# Patient Record
Sex: FEMALE | Race: WHITE
[De-identification: names, ages, dates, MRNs, and addresses within clinical notes are randomized per-mention and may not be internally consistent; named-entity substitution may affect disease eponyms.]

---

## 2020-12-22 ENCOUNTER — HOSPITAL ENCOUNTER (INPATIENT)
Dept: HOSPITAL 95 - ER | Age: 70
LOS: 2 days | Discharge: HOME | DRG: 270 | End: 2020-12-24
Attending: HOSPITALIST | Admitting: HOSPITALIST
Payer: MEDICARE

## 2020-12-22 VITALS — BODY MASS INDEX: 27.55 KG/M2 | WEIGHT: 145.95 LBS | HEIGHT: 60.98 IN

## 2020-12-22 DIAGNOSIS — I82.451: ICD-10-CM

## 2020-12-22 DIAGNOSIS — I82.431: ICD-10-CM

## 2020-12-22 DIAGNOSIS — F41.9: ICD-10-CM

## 2020-12-22 DIAGNOSIS — E86.1: ICD-10-CM

## 2020-12-22 DIAGNOSIS — Z20.828: ICD-10-CM

## 2020-12-22 DIAGNOSIS — I82.421: ICD-10-CM

## 2020-12-22 DIAGNOSIS — I82.441: ICD-10-CM

## 2020-12-22 DIAGNOSIS — E87.1: ICD-10-CM

## 2020-12-22 DIAGNOSIS — I82.411: Primary | ICD-10-CM

## 2020-12-22 DIAGNOSIS — I26.99: ICD-10-CM

## 2020-12-22 DIAGNOSIS — K21.9: ICD-10-CM

## 2020-12-22 LAB
ALBUMIN SERPL BCP-MCNC: 2.5 G/DL (ref 3.4–5)
ALBUMIN/GLOB SERPL: 0.5 {RATIO} (ref 0.8–1.8)
ALT SERPL W P-5'-P-CCNC: 39 U/L (ref 12–78)
ANION GAP SERPL CALCULATED.4IONS-SCNC: 9 MMOL/L (ref 6–16)
AST SERPL W P-5'-P-CCNC: 33 U/L (ref 12–37)
BASOPHILS # BLD AUTO: 0.05 K/MM3 (ref 0–0.23)
BASOPHILS NFR BLD AUTO: 0 % (ref 0–2)
BILIRUB SERPL-MCNC: 1.5 MG/DL (ref 0.1–1)
BUN SERPL-MCNC: 15 MG/DL (ref 8–24)
CALCIUM SERPL-MCNC: 9.8 MG/DL (ref 8.5–10.1)
CHLORIDE SERPL-SCNC: 97 MMOL/L (ref 98–108)
CO2 SERPL-SCNC: 26 MMOL/L (ref 21–32)
CREAT SERPL-MCNC: 0.82 MG/DL (ref 0.4–1)
DEPRECATED RDW RBC AUTO: 46 FL (ref 35.1–46.3)
EOSINOPHIL # BLD AUTO: 0.05 K/MM3 (ref 0–0.68)
EOSINOPHIL NFR BLD AUTO: 0 % (ref 0–6)
ERYTHROCYTE [DISTWIDTH] IN BLOOD BY AUTOMATED COUNT: 13.5 % (ref 11.7–14.2)
GLOBULIN SER CALC-MCNC: 4.9 G/DL (ref 2.2–4)
GLUCOSE SERPL-MCNC: 142 MG/DL (ref 70–99)
HCT VFR BLD AUTO: 49.1 % (ref 33–51)
HGB BLD-MCNC: 16.3 G/DL (ref 11.5–16)
IMM GRANULOCYTES # BLD AUTO: 0.11 K/MM3 (ref 0–0.1)
IMM GRANULOCYTES NFR BLD AUTO: 1 % (ref 0–1)
LYMPHOCYTES # BLD AUTO: 1.28 K/MM3 (ref 0.84–5.2)
LYMPHOCYTES NFR BLD AUTO: 8 % (ref 21–46)
MCHC RBC AUTO-ENTMCNC: 33.2 G/DL (ref 31.5–36.5)
MCV RBC AUTO: 93 FL (ref 80–100)
MONOCYTES # BLD AUTO: 1.53 K/MM3 (ref 0.16–1.47)
MONOCYTES NFR BLD AUTO: 9 % (ref 4–13)
NEUTROPHILS # BLD AUTO: 13.22 K/MM3 (ref 1.96–9.15)
NEUTROPHILS NFR BLD AUTO: 81 % (ref 41–73)
NRBC # BLD AUTO: 0 K/MM3 (ref 0–0.02)
NRBC BLD AUTO-RTO: 0 /100 WBC (ref 0–0.2)
PLATELET # BLD AUTO: 225 K/MM3 (ref 150–400)
POTASSIUM SERPL-SCNC: 3.2 MMOL/L (ref 3.5–5.5)
PROT SERPL-MCNC: 7.4 G/DL (ref 6.4–8.2)
PROTHROMBIN TIME: 12.6 SEC (ref 9.7–11.5)
SODIUM SERPL-SCNC: 132 MMOL/L (ref 136–145)

## 2020-12-22 PROCEDURE — C1880 VENA CAVA FILTER: HCPCS

## 2020-12-22 PROCEDURE — C1887 CATHETER, GUIDING: HCPCS

## 2020-12-22 PROCEDURE — C1769 GUIDE WIRE: HCPCS

## 2020-12-22 PROCEDURE — C1725 CATH, TRANSLUMIN NON-LASER: HCPCS

## 2020-12-22 NOTE — NUR
SHIFT SUMMARY
PT ARRIVED FROM ED THIS EVENING, PT DENIES SIGNIFICANT PAIN IN THE RIGHT LEG,
STATES "THERE IS A SMALL AMOUNT OF PAIN WHEN I WALK" THE RIGHT LEG IS HOT/WARM
TO THE TOUCH BUT IS NOT RED, HOWEVER IT IS SWOLLEN ABOUT TWICE THE SIZE OF THE
LEFT CALF. PT ARRIVED TO PCU ON A HEPARIN GTT THAT WAS VERIFIED WITH RN
CHARGE. PT ORIENTED TO THE ROOM AND UNIT. PT IS PREPARED TO BE NPO AT MIDNIGHT
FOR PROCEDURE IN THE MORNING WITH DR. BUNDY.

## 2020-12-22 NOTE — NUR
LEFT EYE ASSESSMENT ON ADMISSION
PT REPORTS HAVING HAD SHIGLES IN THE LEFT EYE THAT HAS DAMAGED THE PUPIL AND
THE EYE MUSCLES, IT HAS NOT INTERFERED WITH VISION HOWEVER THE LEFT PUPIL IS
ENLARGED.

## 2020-12-23 LAB
BASOPHILS # BLD AUTO: 0.04 K/MM3 (ref 0–0.23)
BASOPHILS NFR BLD AUTO: 0 % (ref 0–2)
DEPRECATED RDW RBC AUTO: 45.4 FL (ref 35.1–46.3)
EOSINOPHIL # BLD AUTO: 0.13 K/MM3 (ref 0–0.68)
EOSINOPHIL NFR BLD AUTO: 1 % (ref 0–6)
ERYTHROCYTE [DISTWIDTH] IN BLOOD BY AUTOMATED COUNT: 13.2 % (ref 11.7–14.2)
HCT VFR BLD AUTO: 44.5 % (ref 33–51)
HGB BLD-MCNC: 14.7 G/DL (ref 11.5–16)
IMM GRANULOCYTES # BLD AUTO: 0.04 K/MM3 (ref 0–0.1)
IMM GRANULOCYTES NFR BLD AUTO: 0 % (ref 0–1)
LYMPHOCYTES # BLD AUTO: 1.65 K/MM3 (ref 0.84–5.2)
LYMPHOCYTES NFR BLD AUTO: 14 % (ref 21–46)
MCHC RBC AUTO-ENTMCNC: 33 G/DL (ref 31.5–36.5)
MCV RBC AUTO: 93 FL (ref 80–100)
MONOCYTES # BLD AUTO: 1.29 K/MM3 (ref 0.16–1.47)
MONOCYTES NFR BLD AUTO: 11 % (ref 4–13)
NEUTROPHILS # BLD AUTO: 8.54 K/MM3 (ref 1.96–9.15)
NEUTROPHILS NFR BLD AUTO: 73 % (ref 41–73)
NRBC # BLD AUTO: 0 K/MM3 (ref 0–0.02)
NRBC BLD AUTO-RTO: 0 /100 WBC (ref 0–0.2)
PLATELET # BLD AUTO: 251 K/MM3 (ref 150–400)

## 2020-12-23 PROCEDURE — 06CM3ZZ EXTIRPATION OF MATTER FROM RIGHT FEMORAL VEIN, PERCUTANEOUS APPROACH: ICD-10-PCS | Performed by: RADIOLOGY

## 2020-12-23 PROCEDURE — 067M3ZZ DILATION OF RIGHT FEMORAL VEIN, PERCUTANEOUS APPROACH: ICD-10-PCS | Performed by: RADIOLOGY

## 2020-12-23 PROCEDURE — B51F1ZZ FLUOROSCOPY OF RIGHT PELVIC (ILIAC) VEINS USING LOW OSMOLAR CONTRAST: ICD-10-PCS | Performed by: RADIOLOGY

## 2020-12-23 PROCEDURE — 06CF3ZZ EXTIRPATION OF MATTER FROM RIGHT EXTERNAL ILIAC VEIN, PERCUTANEOUS APPROACH: ICD-10-PCS | Performed by: RADIOLOGY

## 2020-12-23 PROCEDURE — 06H03DZ INSERTION OF INTRALUMINAL DEVICE INTO INFERIOR VENA CAVA, PERCUTANEOUS APPROACH: ICD-10-PCS | Performed by: RADIOLOGY

## 2020-12-23 PROCEDURE — 06CY3ZZ EXTIRPATION OF MATTER FROM LOWER VEIN, PERCUTANEOUS APPROACH: ICD-10-PCS | Performed by: RADIOLOGY

## 2020-12-23 PROCEDURE — B51BZZZ FLUOROSCOPY OF RIGHT LOWER EXTREMITY VEINS: ICD-10-PCS | Performed by: RADIOLOGY

## 2020-12-23 NOTE — NUR
SHIFT SUMMARY
PT A&Ox4; CALM AND COOPERATIVE WITH CARE. PT RESTING IN BED DURING SHIFT. UP
TO BSC WITH ASSISTANCE. PT TO CATH LAB THIS AM FOR THROMBECTOMY; LEFT GROIN
AND RIGHT POP. VENOUS ACCESS SITES; VSS. PT DENIES PAIN, SOB, NAUSEA,
DIZZINESS AND NUMT/TINGLING. NO OTHER ACUTE CHAGNES MOTED DURING SHIFT. WILL
CONTINUE TO MONITOR UNTIL REPORT GIVEN TO ONCOMING RN.

## 2020-12-24 NOTE — NUR
SHIFT SUMMARY
 
PT RESTED WELL THROUGH NIGHT. ALERT AND ORIENTED - ABLE TO MAKE NEEDS KNOWN.
SATS >90% ON ROOM AIR. TELE NSR. AMBULATING TO BATHROOM - FREQUENT VOIDS, NO
BM. NO C/O PAIN. VSS. HOPEFUL TO GO HOME TODAY. FEMORAL AND POPLITEAL SITES
C/D/I. TRANSITIONED FROM HEP GTT TO XARELTO.
 
CALL LIGHT WITHIN REACH, BED IN LOWEST POSITION. WILL CONTINUE TO MONITOR.

## 2020-12-24 NOTE — NUR
DISCHARGE SUMMARY
PT A&Ox4; CALM AND COOPERATIVE WITH CARE. PT RESTING INBED DURING SHIFT. UP
SBA IN ROOM. PT DENIES PAIN, SOB, NASUEA, DIZZINESS AND NUMB/TING. LEFT GROIN
AND RIGHT POPLITEAL SITES C/D/I. VSS. NO OTHER ACUTE CHAGNES NOTED DURING
SHIFT. EDUCATED ON DISCHARGE INSTRUCTIONS, FOLLOW UP APPOINTMENTS AND
MEDICATIONS. PRESCRIPTIONS FAXED TO [facility] PER PT REQUEST. EDUCATED PT ON
ANTICOAGULENT AND ACTIVITY RESTRICTIONS. PT LEFT ROOM VIA WHEELCHAIR AT 1217.

## 2021-03-11 ENCOUNTER — HOSPITAL ENCOUNTER (OUTPATIENT)
Dept: HOSPITAL 95 - ORSCSDS | Age: 71
Discharge: HOME | End: 2021-03-11
Attending: INTERNAL MEDICINE
Payer: MEDICARE

## 2021-03-11 VITALS — HEIGHT: 60.98 IN | WEIGHT: 145.73 LBS | BODY MASS INDEX: 27.51 KG/M2

## 2021-03-11 DIAGNOSIS — F41.9: ICD-10-CM

## 2021-03-11 DIAGNOSIS — Z79.899: ICD-10-CM

## 2021-03-11 DIAGNOSIS — K44.9: ICD-10-CM

## 2021-03-11 DIAGNOSIS — R11.0: ICD-10-CM

## 2021-03-11 DIAGNOSIS — F32.9: ICD-10-CM

## 2021-03-11 DIAGNOSIS — R10.9: Primary | ICD-10-CM

## 2021-03-11 PROCEDURE — 0DBA8ZX EXCISION OF JEJUNUM, VIA NATURAL OR ARTIFICIAL OPENING ENDOSCOPIC, DIAGNOSTIC: ICD-10-PCS | Performed by: INTERNAL MEDICINE

## 2021-03-11 PROCEDURE — 0DB68ZX EXCISION OF STOMACH, VIA NATURAL OR ARTIFICIAL OPENING ENDOSCOPIC, DIAGNOSTIC: ICD-10-PCS | Performed by: INTERNAL MEDICINE

## 2021-03-25 ENCOUNTER — HOSPITAL ENCOUNTER (OUTPATIENT)
Dept: HOSPITAL 95 - MHTC | Age: 71
Discharge: HOME | End: 2021-03-25
Attending: RADIOLOGY
Payer: MEDICARE

## 2021-03-25 VITALS — HEIGHT: 61 IN | BODY MASS INDEX: 27.06 KG/M2 | WEIGHT: 143.3 LBS

## 2021-03-25 DIAGNOSIS — Z86.711: ICD-10-CM

## 2021-03-25 DIAGNOSIS — Z88.6: ICD-10-CM

## 2021-03-25 DIAGNOSIS — Z88.1: ICD-10-CM

## 2021-03-25 DIAGNOSIS — R03.0: ICD-10-CM

## 2021-03-25 DIAGNOSIS — K21.9: ICD-10-CM

## 2021-03-25 DIAGNOSIS — Z88.2: ICD-10-CM

## 2021-03-25 DIAGNOSIS — I82.4Y9: Primary | ICD-10-CM

## 2021-03-25 PROCEDURE — C1773 RET DEV, INSERTABLE: HCPCS

## 2021-03-25 PROCEDURE — C1894 INTRO/SHEATH, NON-LASER: HCPCS

## 2021-12-16 ENCOUNTER — HOSPITAL ENCOUNTER (OUTPATIENT)
Dept: HOSPITAL 95 - ORSCSDS | Age: 71
Discharge: HOME | End: 2021-12-16
Attending: OPHTHALMOLOGY
Payer: MEDICARE

## 2021-12-16 VITALS — WEIGHT: 144.18 LBS | BODY MASS INDEX: 27.22 KG/M2 | HEIGHT: 61 IN

## 2021-12-16 DIAGNOSIS — F41.9: ICD-10-CM

## 2021-12-16 DIAGNOSIS — Z79.899: ICD-10-CM

## 2021-12-16 DIAGNOSIS — H25.12: Primary | ICD-10-CM

## 2021-12-16 DIAGNOSIS — Z79.01: ICD-10-CM

## 2021-12-16 DIAGNOSIS — K21.9: ICD-10-CM

## 2021-12-16 DIAGNOSIS — I10: ICD-10-CM

## 2021-12-16 PROCEDURE — V2632 POST CHMBR INTRAOCULAR LENS: HCPCS

## 2021-12-16 PROCEDURE — 08RK3JZ REPLACEMENT OF LEFT LENS WITH SYNTHETIC SUBSTITUTE, PERCUTANEOUS APPROACH: ICD-10-PCS | Performed by: OPHTHALMOLOGY

## 2022-03-14 ENCOUNTER — HOSPITAL ENCOUNTER (OUTPATIENT)
Dept: HOSPITAL 95 - LAB SHORT | Age: 72
End: 2022-03-14
Attending: FAMILY MEDICINE
Payer: MEDICARE

## 2022-03-14 DIAGNOSIS — N93.0: Primary | ICD-10-CM

## 2022-05-11 ENCOUNTER — HOSPITAL ENCOUNTER (OUTPATIENT)
Dept: HOSPITAL 95 - LAB SHORT | Age: 72
End: 2022-05-11
Attending: ADVANCED PRACTICE MIDWIFE
Payer: MEDICARE

## 2022-05-11 DIAGNOSIS — N93.0: Primary | ICD-10-CM

## 2022-06-28 ENCOUNTER — HOSPITAL ENCOUNTER (OUTPATIENT)
Dept: HOSPITAL 95 - LAB SHORT | Age: 72
End: 2022-06-28
Attending: ADVANCED PRACTICE MIDWIFE
Payer: MEDICARE

## 2022-06-28 DIAGNOSIS — N76.0: Primary | ICD-10-CM

## 2022-07-01 ENCOUNTER — HOSPITAL ENCOUNTER (OUTPATIENT)
Dept: HOSPITAL 95 - LAB SHORT | Age: 72
End: 2022-07-01
Attending: ADVANCED PRACTICE MIDWIFE
Payer: MEDICARE

## 2022-07-01 DIAGNOSIS — R35.0: Primary | ICD-10-CM

## 2022-10-08 ENCOUNTER — HOSPITAL ENCOUNTER (OUTPATIENT)
Dept: HOSPITAL 95 - LAB | Age: 72
Discharge: HOME | End: 2022-10-08
Attending: FAMILY MEDICINE
Payer: MEDICARE

## 2022-10-08 DIAGNOSIS — R30.9: Primary | ICD-10-CM

## 2022-11-12 ENCOUNTER — HOSPITAL ENCOUNTER (EMERGENCY)
Dept: HOSPITAL 95 - ER | Age: 72
Discharge: HOME | End: 2022-11-12
Payer: MEDICARE

## 2022-11-12 VITALS — HEIGHT: 61 IN | BODY MASS INDEX: 25.49 KG/M2 | WEIGHT: 134.99 LBS

## 2022-11-12 DIAGNOSIS — Z88.2: ICD-10-CM

## 2022-11-12 DIAGNOSIS — Z88.8: ICD-10-CM

## 2022-11-12 DIAGNOSIS — Z79.899: ICD-10-CM

## 2022-11-12 DIAGNOSIS — T78.49XA: Primary | ICD-10-CM

## 2022-11-12 DIAGNOSIS — Z88.5: ICD-10-CM

## 2023-03-02 ENCOUNTER — HOSPITAL ENCOUNTER (OUTPATIENT)
Dept: HOSPITAL 95 - LAB | Age: 73
Discharge: HOME | End: 2023-03-02
Attending: NURSE PRACTITIONER
Payer: MEDICARE

## 2023-03-02 DIAGNOSIS — R30.0: Primary | ICD-10-CM

## 2023-04-04 ENCOUNTER — HOSPITAL ENCOUNTER (OUTPATIENT)
Dept: HOSPITAL 95 - LAB SHORT | Age: 73
Discharge: HOME | End: 2023-04-04
Attending: NURSE PRACTITIONER
Payer: MEDICARE

## 2023-04-04 DIAGNOSIS — N30.90: Primary | ICD-10-CM

## 2023-04-17 ENCOUNTER — HOSPITAL ENCOUNTER (OUTPATIENT)
Dept: HOSPITAL 95 - LAB | Age: 73
Discharge: HOME | End: 2023-04-17
Attending: FAMILY MEDICINE
Payer: MEDICARE

## 2023-04-17 DIAGNOSIS — R30.0: Primary | ICD-10-CM

## 2023-08-29 ENCOUNTER — HOSPITAL ENCOUNTER (OUTPATIENT)
Dept: HOSPITAL 95 - LAB | Age: 73
Discharge: HOME | End: 2023-08-29
Attending: NURSE PRACTITIONER
Payer: MEDICARE

## 2023-08-29 DIAGNOSIS — N89.8: Primary | ICD-10-CM

## 2023-08-29 DIAGNOSIS — R30.0: ICD-10-CM

## 2024-08-12 ENCOUNTER — HOSPITAL ENCOUNTER (OUTPATIENT)
Dept: HOSPITAL 95 - LAB SHORT | Age: 74
End: 2024-08-12
Attending: NURSE PRACTITIONER
Payer: MEDICARE

## 2024-08-12 DIAGNOSIS — R30.0: ICD-10-CM

## 2024-08-12 DIAGNOSIS — N89.8: Primary | ICD-10-CM

## 2024-08-12 LAB
C GLABRATA+KRUSEI DNA VAG QL NAA+PROBE: NOT DETECTED
CANDIDA DNA VAG QL NAA+PROBE: NOT DETECTED

## 2024-08-21 ENCOUNTER — HOSPITAL ENCOUNTER (OUTPATIENT)
Dept: HOSPITAL 95 - LAB | Age: 74
End: 2024-08-21
Attending: FAMILY MEDICINE
Payer: MEDICARE

## 2024-08-21 DIAGNOSIS — N89.8: Primary | ICD-10-CM

## 2024-08-21 LAB
C GLABRATA+KRUSEI DNA VAG QL NAA+PROBE: NOT DETECTED
CANDIDA DNA VAG QL NAA+PROBE: NOT DETECTED
RBC #/AREA URNS HPF: (no result) /HPF (ref 0–2)
SP GR SPEC: 1.01 (ref 1–1.02)
UROBILINOGEN UR STRIP-MCNC: (no result) MG/DL
WBC #/AREA URNS HPF: (no result) /HPF (ref 0–5)

## 2024-10-29 ENCOUNTER — HOSPITAL ENCOUNTER (INPATIENT)
Dept: HOSPITAL 95 - ER | Age: 74
LOS: 4 days | Discharge: HOME | DRG: 872 | End: 2024-11-02
Attending: INTERNAL MEDICINE | Admitting: STUDENT IN AN ORGANIZED HEALTH CARE EDUCATION/TRAINING PROGRAM
Payer: MEDICARE

## 2024-10-29 VITALS — BODY MASS INDEX: 29.39 KG/M2 | WEIGHT: 149.69 LBS | HEIGHT: 60 IN

## 2024-10-29 VITALS — DIASTOLIC BLOOD PRESSURE: 67 MMHG | SYSTOLIC BLOOD PRESSURE: 115 MMHG

## 2024-10-29 VITALS — DIASTOLIC BLOOD PRESSURE: 62 MMHG | SYSTOLIC BLOOD PRESSURE: 104 MMHG

## 2024-10-29 DIAGNOSIS — N13.6: ICD-10-CM

## 2024-10-29 DIAGNOSIS — Z88.5: ICD-10-CM

## 2024-10-29 DIAGNOSIS — Z87.442: ICD-10-CM

## 2024-10-29 DIAGNOSIS — Z88.0: ICD-10-CM

## 2024-10-29 DIAGNOSIS — E87.6: ICD-10-CM

## 2024-10-29 DIAGNOSIS — G47.30: ICD-10-CM

## 2024-10-29 DIAGNOSIS — Z88.1: ICD-10-CM

## 2024-10-29 DIAGNOSIS — Z88.2: ICD-10-CM

## 2024-10-29 DIAGNOSIS — F41.0: ICD-10-CM

## 2024-10-29 DIAGNOSIS — Z98.890: ICD-10-CM

## 2024-10-29 DIAGNOSIS — Z79.899: ICD-10-CM

## 2024-10-29 DIAGNOSIS — Z88.8: ICD-10-CM

## 2024-10-29 DIAGNOSIS — Z16.11: ICD-10-CM

## 2024-10-29 DIAGNOSIS — A41.51: Primary | ICD-10-CM

## 2024-10-29 DIAGNOSIS — D69.6: ICD-10-CM

## 2024-10-29 DIAGNOSIS — K21.9: ICD-10-CM

## 2024-10-29 LAB
ALBUMIN SERPL BCP-MCNC: 3 G/DL (ref 3.4–5)
ALBUMIN/GLOB SERPL: 0.8 {RATIO} (ref 0.8–1.8)
ALT SERPL W P-5'-P-CCNC: 32 U/L (ref 12–78)
ANION GAP SERPL CALCULATED.4IONS-SCNC: 11 MMOL/L (ref 3–11)
AST SERPL W P-5'-P-CCNC: 59 U/L (ref 12–37)
BASOPHILS # BLD: 0 K/MM3 (ref 0–0.23)
BASOPHILS NFR BLD: 0 % (ref 0–2)
BILIRUB SERPL-MCNC: 2.9 MG/DL (ref 0.1–1)
BUN SERPL-MCNC: 20 MG/DL (ref 8–24)
CALCIUM SERPL-MCNC: 9.5 MG/DL (ref 8.5–10.1)
CHLORIDE SERPL-SCNC: 105 MMOL/L (ref 98–108)
CO2 SERPL-SCNC: 21 MMOL/L (ref 21–32)
CREAT SERPL-MCNC: 0.9 MG/DL (ref 0.4–1)
DEPRECATED RDW RBC AUTO: 42 FL (ref 35.1–46.3)
EOSINOPHIL # BLD: 0 K/MM3 (ref 0–0.68)
EOSINOPHIL NFR BLD: 0 % (ref 0–6)
ERYTHROCYTE [DISTWIDTH] IN BLOOD BY AUTOMATED COUNT: 12.7 % (ref 11.7–14.2)
FLUAV RNA SPEC QL NAA+PROBE: NEGATIVE
FLUBV RNA SPEC QL NAA+PROBE: NEGATIVE
GLOBULIN SER CALC-MCNC: 3.8 G/DL (ref 2.2–4)
GLUCOSE SERPL-MCNC: 139 MG/DL (ref 70–99)
HCT VFR BLD AUTO: 43.2 % (ref 33–51)
HGB BLD-MCNC: 15.2 G/DL (ref 11.5–16)
KETONES UR STRIP-MCNC: (no result) MG/DL
LEUKOCYTE ESTERASE UR QL STRIP: (no result)
LYMPHOCYTES # BLD: 0.32 K/MM3 (ref 0.84–5.2)
LYMPHOCYTES NFR BLD: 7 % (ref 21–46)
MCHC RBC AUTO-ENTMCNC: 35.2 G/DL (ref 31.5–36.5)
MCV RBC AUTO: 91 FL (ref 80–100)
MONOCYTES # BLD: 0.04 K/MM3 (ref 0.16–1.47)
MONOCYTES NFR BLD: 1 % (ref 4–13)
NEUTS BAND NFR BLD MANUAL: 5 % (ref 0–8)
NEUTS SEG # BLD MANUAL: 4.33 K/MM3 (ref 1.96–9.15)
NEUTS SEG NFR BLD MANUAL: 87 % (ref 41–73)
NRBC # BLD AUTO: 0 K/MM3 (ref 0–0.02)
NRBC BLD AUTO-RTO: 0 /100 WBC (ref 0–0.2)
PLATELET # BLD AUTO: 90 K/MM3 (ref 150–400)
POTASSIUM SERPL-SCNC: 3.3 MMOL/L (ref 3.5–5.5)
PROT SERPL-MCNC: 6.8 G/DL (ref 6.4–8.2)
PROT UR STRIP-MCNC: (no result) MG/DL
RBC #/AREA URNS HPF: (no result) /HPF (ref 0–2)
RSV RNA SPEC QL NAA+PROBE: NEGATIVE
SARS-COV-2 RNA RESP QL NAA+PROBE: NEGATIVE
SODIUM SERPL-SCNC: 134 MMOL/L (ref 136–145)
SP GR SPEC: 1.02 (ref 1–1.02)
TOTAL CELLS COUNTED BLD: 100
UROBILINOGEN UR STRIP-MCNC: (no result) MG/DL
WBC #/AREA URNS HPF: (no result) /HPF (ref 0–5)

## 2024-10-29 PROCEDURE — A9270 NON-COVERED ITEM OR SERVICE: HCPCS

## 2024-10-29 PROCEDURE — C1769 GUIDE WIRE: HCPCS

## 2024-10-29 PROCEDURE — C1729 CATH, DRAINAGE: HCPCS

## 2024-10-30 VITALS — SYSTOLIC BLOOD PRESSURE: 99 MMHG | DIASTOLIC BLOOD PRESSURE: 63 MMHG

## 2024-10-30 VITALS — DIASTOLIC BLOOD PRESSURE: 72 MMHG | SYSTOLIC BLOOD PRESSURE: 100 MMHG

## 2024-10-30 VITALS — DIASTOLIC BLOOD PRESSURE: 98 MMHG | SYSTOLIC BLOOD PRESSURE: 134 MMHG

## 2024-10-30 VITALS — DIASTOLIC BLOOD PRESSURE: 59 MMHG | SYSTOLIC BLOOD PRESSURE: 102 MMHG

## 2024-10-30 VITALS — DIASTOLIC BLOOD PRESSURE: 60 MMHG | SYSTOLIC BLOOD PRESSURE: 100 MMHG

## 2024-10-30 VITALS — DIASTOLIC BLOOD PRESSURE: 61 MMHG | SYSTOLIC BLOOD PRESSURE: 97 MMHG

## 2024-10-30 VITALS — SYSTOLIC BLOOD PRESSURE: 103 MMHG | DIASTOLIC BLOOD PRESSURE: 66 MMHG

## 2024-10-30 VITALS — SYSTOLIC BLOOD PRESSURE: 104 MMHG | DIASTOLIC BLOOD PRESSURE: 70 MMHG

## 2024-10-30 VITALS — SYSTOLIC BLOOD PRESSURE: 108 MMHG | DIASTOLIC BLOOD PRESSURE: 69 MMHG

## 2024-10-30 VITALS — SYSTOLIC BLOOD PRESSURE: 137 MMHG | DIASTOLIC BLOOD PRESSURE: 74 MMHG

## 2024-10-30 VITALS — DIASTOLIC BLOOD PRESSURE: 62 MMHG | SYSTOLIC BLOOD PRESSURE: 98 MMHG

## 2024-10-30 VITALS — DIASTOLIC BLOOD PRESSURE: 63 MMHG | SYSTOLIC BLOOD PRESSURE: 104 MMHG

## 2024-10-30 VITALS — SYSTOLIC BLOOD PRESSURE: 100 MMHG | DIASTOLIC BLOOD PRESSURE: 65 MMHG

## 2024-10-30 VITALS — DIASTOLIC BLOOD PRESSURE: 80 MMHG | SYSTOLIC BLOOD PRESSURE: 126 MMHG

## 2024-10-30 VITALS — SYSTOLIC BLOOD PRESSURE: 104 MMHG | DIASTOLIC BLOOD PRESSURE: 60 MMHG

## 2024-10-30 VITALS — SYSTOLIC BLOOD PRESSURE: 95 MMHG | DIASTOLIC BLOOD PRESSURE: 69 MMHG

## 2024-10-30 VITALS — DIASTOLIC BLOOD PRESSURE: 90 MMHG | SYSTOLIC BLOOD PRESSURE: 106 MMHG

## 2024-10-30 VITALS — DIASTOLIC BLOOD PRESSURE: 74 MMHG | SYSTOLIC BLOOD PRESSURE: 111 MMHG

## 2024-10-30 VITALS — SYSTOLIC BLOOD PRESSURE: 110 MMHG | DIASTOLIC BLOOD PRESSURE: 68 MMHG

## 2024-10-30 VITALS — DIASTOLIC BLOOD PRESSURE: 60 MMHG | SYSTOLIC BLOOD PRESSURE: 97 MMHG

## 2024-10-30 VITALS — DIASTOLIC BLOOD PRESSURE: 54 MMHG | SYSTOLIC BLOOD PRESSURE: 93 MMHG

## 2024-10-30 VITALS — DIASTOLIC BLOOD PRESSURE: 57 MMHG | SYSTOLIC BLOOD PRESSURE: 108 MMHG

## 2024-10-30 VITALS — SYSTOLIC BLOOD PRESSURE: 99 MMHG | DIASTOLIC BLOOD PRESSURE: 67 MMHG

## 2024-10-30 VITALS — DIASTOLIC BLOOD PRESSURE: 67 MMHG | SYSTOLIC BLOOD PRESSURE: 97 MMHG

## 2024-10-30 VITALS — DIASTOLIC BLOOD PRESSURE: 62 MMHG | SYSTOLIC BLOOD PRESSURE: 96 MMHG

## 2024-10-30 VITALS — SYSTOLIC BLOOD PRESSURE: 100 MMHG | DIASTOLIC BLOOD PRESSURE: 86 MMHG

## 2024-10-30 VITALS — DIASTOLIC BLOOD PRESSURE: 83 MMHG | SYSTOLIC BLOOD PRESSURE: 118 MMHG

## 2024-10-30 VITALS — SYSTOLIC BLOOD PRESSURE: 93 MMHG | DIASTOLIC BLOOD PRESSURE: 64 MMHG

## 2024-10-30 VITALS — DIASTOLIC BLOOD PRESSURE: 57 MMHG | SYSTOLIC BLOOD PRESSURE: 101 MMHG

## 2024-10-30 VITALS — DIASTOLIC BLOOD PRESSURE: 58 MMHG | SYSTOLIC BLOOD PRESSURE: 93 MMHG

## 2024-10-30 VITALS — SYSTOLIC BLOOD PRESSURE: 101 MMHG | DIASTOLIC BLOOD PRESSURE: 62 MMHG

## 2024-10-30 VITALS — SYSTOLIC BLOOD PRESSURE: 104 MMHG | DIASTOLIC BLOOD PRESSURE: 62 MMHG

## 2024-10-30 VITALS — SYSTOLIC BLOOD PRESSURE: 77 MMHG | DIASTOLIC BLOOD PRESSURE: 55 MMHG

## 2024-10-30 VITALS — DIASTOLIC BLOOD PRESSURE: 63 MMHG | SYSTOLIC BLOOD PRESSURE: 94 MMHG

## 2024-10-30 VITALS — DIASTOLIC BLOOD PRESSURE: 64 MMHG | SYSTOLIC BLOOD PRESSURE: 95 MMHG

## 2024-10-30 VITALS — SYSTOLIC BLOOD PRESSURE: 128 MMHG | DIASTOLIC BLOOD PRESSURE: 109 MMHG

## 2024-10-30 VITALS — DIASTOLIC BLOOD PRESSURE: 61 MMHG | SYSTOLIC BLOOD PRESSURE: 106 MMHG

## 2024-10-30 VITALS — DIASTOLIC BLOOD PRESSURE: 64 MMHG | SYSTOLIC BLOOD PRESSURE: 97 MMHG

## 2024-10-30 VITALS — SYSTOLIC BLOOD PRESSURE: 109 MMHG | DIASTOLIC BLOOD PRESSURE: 69 MMHG

## 2024-10-30 VITALS — SYSTOLIC BLOOD PRESSURE: 113 MMHG | DIASTOLIC BLOOD PRESSURE: 96 MMHG

## 2024-10-30 VITALS — DIASTOLIC BLOOD PRESSURE: 47 MMHG | SYSTOLIC BLOOD PRESSURE: 90 MMHG

## 2024-10-30 VITALS — DIASTOLIC BLOOD PRESSURE: 75 MMHG | SYSTOLIC BLOOD PRESSURE: 104 MMHG

## 2024-10-30 VITALS — DIASTOLIC BLOOD PRESSURE: 52 MMHG | SYSTOLIC BLOOD PRESSURE: 94 MMHG

## 2024-10-30 VITALS — SYSTOLIC BLOOD PRESSURE: 108 MMHG | DIASTOLIC BLOOD PRESSURE: 60 MMHG

## 2024-10-30 VITALS — SYSTOLIC BLOOD PRESSURE: 97 MMHG | DIASTOLIC BLOOD PRESSURE: 60 MMHG

## 2024-10-30 VITALS — SYSTOLIC BLOOD PRESSURE: 121 MMHG | DIASTOLIC BLOOD PRESSURE: 71 MMHG

## 2024-10-30 VITALS — DIASTOLIC BLOOD PRESSURE: 60 MMHG | SYSTOLIC BLOOD PRESSURE: 102 MMHG

## 2024-10-30 VITALS — SYSTOLIC BLOOD PRESSURE: 106 MMHG | DIASTOLIC BLOOD PRESSURE: 66 MMHG

## 2024-10-30 VITALS — DIASTOLIC BLOOD PRESSURE: 61 MMHG | SYSTOLIC BLOOD PRESSURE: 103 MMHG

## 2024-10-30 VITALS — SYSTOLIC BLOOD PRESSURE: 99 MMHG | DIASTOLIC BLOOD PRESSURE: 66 MMHG

## 2024-10-30 VITALS — SYSTOLIC BLOOD PRESSURE: 98 MMHG | DIASTOLIC BLOOD PRESSURE: 64 MMHG

## 2024-10-30 LAB
ALBUMIN SERPL BCP-MCNC: 2.2 G/DL (ref 3.4–5)
ALBUMIN/GLOB SERPL: 0.8 {RATIO} (ref 0.8–1.8)
ALT SERPL W P-5'-P-CCNC: 21 U/L (ref 12–78)
ANION GAP SERPL CALCULATED.4IONS-SCNC: 10 MMOL/L (ref 3–11)
AST SERPL W P-5'-P-CCNC: 33 U/L (ref 12–37)
BASOPHILS # BLD: 0 K/MM3 (ref 0–0.23)
BASOPHILS NFR BLD: 0 % (ref 0–2)
BILIRUB SERPL-MCNC: 2.4 MG/DL (ref 0.1–1)
BUN SERPL-MCNC: 18 MG/DL (ref 8–24)
CALCIUM SERPL-MCNC: 8 MG/DL (ref 8.5–10.1)
CHLORIDE SERPL-SCNC: 110 MMOL/L (ref 98–108)
CO2 SERPL-SCNC: 21 MMOL/L (ref 21–32)
CREAT SERPL-MCNC: 0.97 MG/DL (ref 0.4–1)
DEPRECATED RDW RBC AUTO: 43.6 FL (ref 35.1–46.3)
EOSINOPHIL # BLD: 0 K/MM3 (ref 0–0.68)
EOSINOPHIL NFR BLD: 0 % (ref 0–6)
ERYTHROCYTE [DISTWIDTH] IN BLOOD BY AUTOMATED COUNT: 12.9 % (ref 11.7–14.2)
GLOBULIN SER CALC-MCNC: 2.8 G/DL (ref 2.2–4)
GLUCOSE SERPL-MCNC: 92 MG/DL (ref 70–99)
HCT VFR BLD AUTO: 35.7 % (ref 33–51)
HGB BLD-MCNC: 12.2 G/DL (ref 11.5–16)
LYMPHOCYTES # BLD: 0.05 K/MM3 (ref 0.84–5.2)
LYMPHOCYTES NFR BLD: 1 % (ref 21–46)
MCHC RBC AUTO-ENTMCNC: 34.2 G/DL (ref 31.5–36.5)
MCV RBC AUTO: 92 FL (ref 80–100)
METAMYELOCYTES # BLD MANUAL: 0.05 K/MM3 (ref 0–0)
METAMYELOCYTES NFR BLD MANUAL: 1 % (ref 0–0)
MONOCYTES # BLD: 0.11 K/MM3 (ref 0.16–1.47)
MONOCYTES NFR BLD: 2 % (ref 4–13)
MYELOCYTES # BLD MANUAL: 0.05 K/MM3 (ref 0–0)
MYELOCYTES NFR BLD MANUAL: 1 % (ref 0–0)
NEUTS BAND NFR BLD MANUAL: 25 % (ref 0–8)
NEUTS SEG # BLD MANUAL: 5.43 K/MM3 (ref 1.96–9.15)
NEUTS SEG NFR BLD MANUAL: 70 % (ref 41–73)
NRBC # BLD AUTO: 0 K/MM3 (ref 0–0.02)
NRBC BLD AUTO-RTO: 0 /100 WBC (ref 0–0.2)
PLATELET # BLD AUTO: 54 K/MM3 (ref 150–400)
POTASSIUM SERPL-SCNC: 3.5 MMOL/L (ref 3.5–5.5)
PROT SERPL-MCNC: 5 G/DL (ref 6.4–8.2)
SODIUM SERPL-SCNC: 137 MMOL/L (ref 136–145)
TOTAL CELLS COUNTED BLD: 100

## 2024-10-30 PROCEDURE — 3E03329 INTRODUCTION OF OTHER ANTI-INFECTIVE INTO PERIPHERAL VEIN, PERCUTANEOUS APPROACH: ICD-10-PCS | Performed by: INTERNAL MEDICINE

## 2024-10-30 PROCEDURE — 0T9330Z DRAINAGE OF RIGHT KIDNEY PELVIS WITH DRAINAGE DEVICE, PERCUTANEOUS APPROACH: ICD-10-PCS | Performed by: INTERNAL MEDICINE

## 2024-10-31 VITALS — SYSTOLIC BLOOD PRESSURE: 134 MMHG | DIASTOLIC BLOOD PRESSURE: 94 MMHG

## 2024-10-31 VITALS — SYSTOLIC BLOOD PRESSURE: 119 MMHG | DIASTOLIC BLOOD PRESSURE: 74 MMHG

## 2024-10-31 VITALS — SYSTOLIC BLOOD PRESSURE: 139 MMHG | DIASTOLIC BLOOD PRESSURE: 60 MMHG

## 2024-10-31 VITALS — DIASTOLIC BLOOD PRESSURE: 82 MMHG | SYSTOLIC BLOOD PRESSURE: 117 MMHG

## 2024-10-31 VITALS — DIASTOLIC BLOOD PRESSURE: 81 MMHG | SYSTOLIC BLOOD PRESSURE: 142 MMHG

## 2024-10-31 VITALS — DIASTOLIC BLOOD PRESSURE: 72 MMHG | SYSTOLIC BLOOD PRESSURE: 114 MMHG

## 2024-10-31 VITALS — SYSTOLIC BLOOD PRESSURE: 136 MMHG | DIASTOLIC BLOOD PRESSURE: 92 MMHG

## 2024-10-31 VITALS — SYSTOLIC BLOOD PRESSURE: 146 MMHG | DIASTOLIC BLOOD PRESSURE: 91 MMHG

## 2024-10-31 LAB
ANION GAP SERPL CALCULATED.4IONS-SCNC: 11 MMOL/L (ref 3–11)
BASOPHILS # BLD AUTO: 0.03 K/MM3 (ref 0–0.23)
BASOPHILS NFR BLD AUTO: 0 % (ref 0–2)
BUN SERPL-MCNC: 17 MG/DL (ref 8–24)
CALCIUM SERPL-MCNC: 9.1 MG/DL (ref 8.5–10.1)
CHLORIDE SERPL-SCNC: 111 MMOL/L (ref 98–108)
CO2 SERPL-SCNC: 21 MMOL/L (ref 21–32)
CREAT SERPL-MCNC: 0.74 MG/DL (ref 0.4–1)
DEPRECATED RDW RBC AUTO: 45.3 FL (ref 35.1–46.3)
EOSINOPHIL # BLD AUTO: 0.03 K/MM3 (ref 0–0.68)
EOSINOPHIL NFR BLD AUTO: 0 % (ref 0–6)
ERYTHROCYTE [DISTWIDTH] IN BLOOD BY AUTOMATED COUNT: 13.3 % (ref 11.7–14.2)
GLUCOSE SERPL-MCNC: 90 MG/DL (ref 70–99)
HCT VFR BLD AUTO: 41.7 % (ref 33–51)
HGB BLD-MCNC: 14.1 G/DL (ref 11.5–16)
IMM GRANULOCYTES # BLD AUTO: 0.04 K/MM3 (ref 0–0.1)
IMM GRANULOCYTES NFR BLD AUTO: 1 % (ref 0–1)
LYMPHOCYTES # BLD AUTO: 0.49 K/MM3 (ref 0.84–5.2)
LYMPHOCYTES NFR BLD AUTO: 6 % (ref 21–46)
MCHC RBC AUTO-ENTMCNC: 33.8 G/DL (ref 31.5–36.5)
MCV RBC AUTO: 93 FL (ref 80–100)
MONOCYTES # BLD AUTO: 0.48 K/MM3 (ref 0.16–1.47)
MONOCYTES NFR BLD AUTO: 6 % (ref 4–13)
NEUTROPHILS # BLD AUTO: 6.61 K/MM3 (ref 1.96–9.15)
NEUTROPHILS NFR BLD AUTO: 86 % (ref 41–73)
NRBC # BLD AUTO: 0 K/MM3 (ref 0–0.02)
NRBC BLD AUTO-RTO: 0 /100 WBC (ref 0–0.2)
PLATELET # BLD AUTO: 54 K/MM3 (ref 150–400)
POTASSIUM SERPL-SCNC: 4.2 MMOL/L (ref 3.5–5.5)
SODIUM SERPL-SCNC: 139 MMOL/L (ref 136–145)

## 2024-11-01 VITALS — SYSTOLIC BLOOD PRESSURE: 168 MMHG | DIASTOLIC BLOOD PRESSURE: 92 MMHG

## 2024-11-01 VITALS — DIASTOLIC BLOOD PRESSURE: 88 MMHG | SYSTOLIC BLOOD PRESSURE: 154 MMHG

## 2024-11-01 VITALS — SYSTOLIC BLOOD PRESSURE: 161 MMHG | DIASTOLIC BLOOD PRESSURE: 94 MMHG

## 2024-11-01 VITALS — SYSTOLIC BLOOD PRESSURE: 156 MMHG | DIASTOLIC BLOOD PRESSURE: 96 MMHG

## 2024-11-01 LAB
BASOPHILS # BLD AUTO: 0.02 K/MM3 (ref 0–0.23)
BASOPHILS NFR BLD AUTO: 0 % (ref 0–2)
DEPRECATED RDW RBC AUTO: 44.7 FL (ref 35.1–46.3)
EOSINOPHIL # BLD AUTO: 0.04 K/MM3 (ref 0–0.68)
EOSINOPHIL NFR BLD AUTO: 1 % (ref 0–6)
ERYTHROCYTE [DISTWIDTH] IN BLOOD BY AUTOMATED COUNT: 13.2 % (ref 11.7–14.2)
HCT VFR BLD AUTO: 38.3 % (ref 33–51)
HGB BLD-MCNC: 13.3 G/DL (ref 11.5–16)
IMM GRANULOCYTES # BLD AUTO: 0.07 K/MM3 (ref 0–0.1)
IMM GRANULOCYTES NFR BLD AUTO: 1 % (ref 0–1)
LYMPHOCYTES # BLD AUTO: 0.74 K/MM3 (ref 0.84–5.2)
LYMPHOCYTES NFR BLD AUTO: 10 % (ref 21–46)
MCHC RBC AUTO-ENTMCNC: 34.7 G/DL (ref 31.5–36.5)
MCV RBC AUTO: 91 FL (ref 80–100)
MONOCYTES # BLD AUTO: 0.54 K/MM3 (ref 0.16–1.47)
MONOCYTES NFR BLD AUTO: 7 % (ref 4–13)
NEUTROPHILS # BLD AUTO: 6.03 K/MM3 (ref 1.96–9.15)
NEUTROPHILS NFR BLD AUTO: 81 % (ref 41–73)
NRBC # BLD AUTO: 0 K/MM3 (ref 0–0.02)
NRBC BLD AUTO-RTO: 0 /100 WBC (ref 0–0.2)
PLATELET # BLD AUTO: 69 K/MM3 (ref 150–400)

## 2024-11-02 VITALS — DIASTOLIC BLOOD PRESSURE: 92 MMHG | SYSTOLIC BLOOD PRESSURE: 173 MMHG

## 2024-11-02 VITALS — SYSTOLIC BLOOD PRESSURE: 174 MMHG | DIASTOLIC BLOOD PRESSURE: 92 MMHG

## 2024-11-02 LAB
ANION GAP SERPL CALCULATED.4IONS-SCNC: 9 MMOL/L (ref 3–11)
BASOPHILS # BLD: 0 K/MM3 (ref 0–0.23)
BASOPHILS NFR BLD: 0 % (ref 0–2)
BUN SERPL-MCNC: 12 MG/DL (ref 8–24)
CALCIUM SERPL-MCNC: 9.1 MG/DL (ref 8.5–10.1)
CHLORIDE SERPL-SCNC: 112 MMOL/L (ref 98–108)
CO2 SERPL-SCNC: 26 MMOL/L (ref 21–32)
CREAT SERPL-MCNC: 0.64 MG/DL (ref 0.4–1)
DEPRECATED RDW RBC AUTO: 45.6 FL (ref 35.1–46.3)
EOSINOPHIL # BLD: 0 K/MM3 (ref 0–0.68)
EOSINOPHIL NFR BLD: 0 % (ref 0–6)
ERYTHROCYTE [DISTWIDTH] IN BLOOD BY AUTOMATED COUNT: 13.5 % (ref 11.7–14.2)
GLUCOSE SERPL-MCNC: 103 MG/DL (ref 70–99)
HCT VFR BLD AUTO: 36.8 % (ref 33–51)
HGB BLD-MCNC: 12.6 G/DL (ref 11.5–16)
LYMPHOCYTES # BLD: 0.86 K/MM3 (ref 0.84–5.2)
LYMPHOCYTES NFR BLD: 15 % (ref 21–46)
MCHC RBC AUTO-ENTMCNC: 34.2 G/DL (ref 31.5–36.5)
MCV RBC AUTO: 91 FL (ref 80–100)
MONOCYTES # BLD: 0.57 K/MM3 (ref 0.16–1.47)
MONOCYTES NFR BLD: 10 % (ref 4–13)
NEUTS SEG # BLD MANUAL: 4.33 K/MM3 (ref 1.96–9.15)
NEUTS SEG NFR BLD MANUAL: 75 % (ref 41–73)
NRBC # BLD AUTO: 0 K/MM3 (ref 0–0.02)
NRBC BLD AUTO-RTO: 0 /100 WBC (ref 0–0.2)
PLATELET # BLD AUTO: 92 K/MM3 (ref 150–400)
POTASSIUM SERPL-SCNC: 3.3 MMOL/L (ref 3.5–5.5)
SODIUM SERPL-SCNC: 144 MMOL/L (ref 136–145)
TOTAL CELLS COUNTED BLD: 100

## 2025-01-08 ENCOUNTER — HOSPITAL ENCOUNTER (OUTPATIENT)
Dept: HOSPITAL 95 - ORSCMMR | Age: 75
Discharge: HOME | End: 2025-01-08
Attending: INTERNAL MEDICINE
Payer: COMMERCIAL

## 2025-01-08 VITALS — SYSTOLIC BLOOD PRESSURE: 178 MMHG | DIASTOLIC BLOOD PRESSURE: 115 MMHG

## 2025-01-08 VITALS — BODY MASS INDEX: 26.71 KG/M2 | WEIGHT: 132.5 LBS | HEIGHT: 59.25 IN

## 2025-01-08 VITALS — SYSTOLIC BLOOD PRESSURE: 182 MMHG | DIASTOLIC BLOOD PRESSURE: 98 MMHG

## 2025-01-08 VITALS — DIASTOLIC BLOOD PRESSURE: 115 MMHG | SYSTOLIC BLOOD PRESSURE: 184 MMHG

## 2025-01-08 VITALS — DIASTOLIC BLOOD PRESSURE: 112 MMHG | SYSTOLIC BLOOD PRESSURE: 167 MMHG

## 2025-01-08 DIAGNOSIS — Z12.11: Primary | ICD-10-CM

## 2025-01-08 DIAGNOSIS — Z53.9: ICD-10-CM

## 2025-01-08 NOTE — NUR
PT HYPERTENSIVE, DENIES SYMPTOMS, BUT STS SHE ALWAYS HAS HIGH READINGS AT ANY
TYPE OF DRVivian ASHRAF MD NOTIFIED.

## 2025-01-08 NOTE — NUR
DR. DUTTON AT BEDSIDE TO SPEAK WITH PATIENT REGARDING NEED TO RESCHEDULE
PROCEDURE DUE TO HYPERTENSION. PT VERBALIZES DISAPPOINTMENT, BUT CALM &
COOPERATIVE, STS SHE UNDERSTANDS. PER DR. DUTTON, PT TO KEEP FOLLOW-UP APPT &
THEY WILL GO FROM THERE.

## 2025-01-08 NOTE — NUR
History, Chart, Medications and Allergies reviewed before start of procedure.
Patient up to Ambulate independently. Gait steady.  Pre-Op teaching done. Pt
verbalizes understanding.  Patient confirms NPO status and agrees with
scheduled surgery. Pt states she did not see previous bowel movements, so
unsure of color or adequacy.  Patient States Post-Procedure ride home has been
arranged.

## 2025-01-17 ENCOUNTER — HOSPITAL ENCOUNTER (OUTPATIENT)
Dept: HOSPITAL 95 - LAB SHORT | Age: 75
Discharge: HOME | End: 2025-01-17
Attending: STUDENT IN AN ORGANIZED HEALTH CARE EDUCATION/TRAINING PROGRAM
Payer: COMMERCIAL

## 2025-01-17 DIAGNOSIS — N20.0: ICD-10-CM

## 2025-01-17 DIAGNOSIS — M81.0: Primary | ICD-10-CM

## 2025-01-17 LAB
CALCIUM 24H UR-MRATE: 253.5 MG/24HRS (ref 42–353)
CALCIUM UR-MCNC: 16.9 MG/DL (ref ?–17.5)

## 2025-01-24 LAB
CA H2 PHOS DIHYD 24H SATFR UR: 5.24
CALCIUM 24H UR-MRATE: 267 MG/D (ref 100–250)
CALCIUM UR-MCNC: 17.8 MG/DL
CAOX INDEX 24H UR-RTO: 5.56
CHLORIDE 24H UR-SRATE: 70 MMOL/D (ref 140–250)
CHLORIDE UR-SCNC: 47 MMOL/L
CITRATE 24H UR-MRATE: 586 MG/D (ref 320–1240)
CITRATE UR-MCNC: 391 MG/L
COLLECT DURATION TIME SPEC: 24 HR
CREAT 24H UR-MRATE: 705 MG/D (ref 500–1400)
CREAT UR-MCNC: 47 MG/DL
MAGNESIUM 24H UR-MRATE: 44 MG/D (ref 12–199)
MAGNESIUM UR-MCNC: 2.9 MG/DL
OXALATE 24H UR-MRATE: 15 MG/D (ref 13–40)
OXALATE UR-MCNC: 10 MG/L
PHOSPHATE 24H UR-MRATE: 465 MG/D (ref 400–1300)
PHOSPHATE UR-MCNC: 31 MG/DL
POTASSIUM 24H UR-SRATE: 40 MMOL/D (ref 25–125)
POTASSIUM UR-SCNC: 27 MMOL/L
SODIUM 24H UR-SRATE: 78 MMOL/D (ref 51–286)
SODIUM UR-SCNC: 52 MMOL/L
SPECIMEN VOL ?TM UR: 1500 ML
SULFATE 24H UR-SRATE: < 5 MMOL/D (ref 6–30)
SULFATE UR-SCNC: <5 MMOL/L
URATE 24H SATFR UR: 0.04
URATE 24H UR-MRATE: 200 MG/D (ref 250–750)
URATE UR-MCNC: 13.3 MG/DL

## 2025-06-10 ENCOUNTER — HOSPITAL ENCOUNTER (OUTPATIENT)
Dept: HOSPITAL 95 - LAB SHORT | Age: 75
Discharge: HOME | End: 2025-06-10
Attending: FAMILY MEDICINE
Payer: COMMERCIAL

## 2025-06-10 DIAGNOSIS — N89.8: Primary | ICD-10-CM

## 2025-06-10 LAB
BACTERIAL VAGINOSIS VAG-IMP: POSITIVE
C GLABRATA+KRUSEI DNA VAG QL NAA+PROBE: NOT DETECTED
C TRACH DNA VAG QL NAA+PROBE: NOT DETECTED
CANDIDA DNA VAG QL NAA+PROBE: NOT DETECTED
N GONORRHOEA DNA VAG QL NAA+PROBE: NOT DETECTED